# Patient Record
Sex: MALE | Race: WHITE
[De-identification: names, ages, dates, MRNs, and addresses within clinical notes are randomized per-mention and may not be internally consistent; named-entity substitution may affect disease eponyms.]

---

## 2017-03-18 ENCOUNTER — HOSPITAL ENCOUNTER (EMERGENCY)
Dept: HOSPITAL 61 - ER | Age: 22
Discharge: HOME | End: 2017-03-18
Payer: SELF-PAY

## 2017-03-18 VITALS — BODY MASS INDEX: 18.96 KG/M2 | HEIGHT: 72 IN | WEIGHT: 140 LBS

## 2017-03-18 VITALS — SYSTOLIC BLOOD PRESSURE: 119 MMHG | DIASTOLIC BLOOD PRESSURE: 69 MMHG

## 2017-03-18 DIAGNOSIS — R00.0: ICD-10-CM

## 2017-03-18 DIAGNOSIS — F41.9: ICD-10-CM

## 2017-03-18 DIAGNOSIS — F32.9: ICD-10-CM

## 2017-03-18 DIAGNOSIS — R55: Primary | ICD-10-CM

## 2017-03-18 PROCEDURE — 93005 ELECTROCARDIOGRAM TRACING: CPT

## 2017-03-18 NOTE — PHYS DOC
Past Medical History


Past Medical History:  Anxiety, Arrhythmia, Depression, Other


Additional Past Medical Histor:  SVT


Past Surgical History:  Other


Additional Past Surgical Histo:  cardiac ablation x2


Alcohol Use:  Rarely


Drug Use:  None





Adult General


Chief Complaint


Chief Complaint:  SEIZURE





HPI


HPI


21-year-old male presenting to the emergency department today with a syncopal 

episode today. The patient reports having a history of SVT having seen multiple 

cardiologists and currently following with the cardiologist. He is status post 

an ablation which was unfortunately unsuccessful. He has had multiple syncopal 

events from this and currently does not desire medical evaluation in the 

emergency department because "it always turns out to be normal". His boss made 

him come today. This happened while he was at work.





Review of systems is negative for chest pain shortness of breath abdominal pain 

nausea vomiting. Currently the patient denies any symptoms. All other review of 

systems is negative unless otherwise noted in history of present illness.





Review of Systems


Review of Systems


SEE ABOVE.





Allergies


Allergies





 Allergies








Coded Allergies Type Severity Reaction Last Updated Verified


 


  No Known Drug Allergies    5/6/14 No











Physical Exam


Physical Exam





Constitutional: Well developed, well nourished, no acute distress, non-toxic 

appearance. []


HENT: Normocephalic, atraumatic, bilateral external ears normal, oropharynx 

moist, no oral exudates, nose normal. []


Eyes: PERRLA, EOMI, conjunctiva normal, no discharge. [] 


Neck: Normal range of motion, no tenderness, supple, no stridor. [] 


Cardiovascular:Heart rate regular rhythm, no murmur []


Lungs & Thorax:  Bilateral breath sounds clear to auscultation []


Abdomen: Bowel sounds normal, soft, no tenderness, no masses, no pulsatile 

masses. [] 


Skin: Warm, dry, no erythema, no rash. [] 


Back: No tenderness, no CVA tenderness. [] 


Extremities: No tenderness, no cyanosis, no clubbing, ROM intact, no edema. [] 


Neurologic: Alert and oriented X 3, normal motor function, normal sensory 

function, no focal deficits noted. []


Psychologic: Affect normal, judgement normal, mood normal. []





Current Patient Data


Vital Signs





 Vital Signs








  Date Time  Temp Pulse Resp B/P Pulse Ox O2 Delivery O2 Flow Rate FiO2


 


3/18/17 02:22 98.5 118 16 119/69 100 Room Air  





 98.5       











EKG


EKG


[]





Radiology/Procedures


Radiology/Procedures


[]





Course & Med Decision Making


Course & Med Decision Making


Pertinent Labs and Imaging studies reviewed. (See chart for details)





[] 21-year-old male presenting to the emergency department today after having 

syncopal episode. Vital signs showed tachycardia otherwise unremarkable. 

Pertinent physical exam shows tachycardia otherwise unremarkable. EKG obtained 

which shows sinus tachycardia with a heart rate around 110 which the patient 

reports is normal. The patient did not desire to be treated or evaluated 

further. He demonstrated medical decision making capacity. He should insight 

into his situation. I can understand from his situation. I recommended he 

follow up with his cardiologist and primary care doctor over the next 2-3 days.





Dragon Disclaimer


Dragon Disclaimer


This electronic medical record was generated, in whole or in part, using a 

voice recognition dictation system.





Departure


Departure


Impression:  


 Primary Impression:  


 Syncope


Disposition:  01 HOME, SELF-CARE


Condition:  STABLE


Referrals:  


ENEDELIA TODD MD (PCP)


Patient Instructions:  Syncope





Additional Instructions:


Thank you for allowing us to participate in your care today.





Followup with your primary care physician in 3 days if your symptoms do not 

improve. If you do not have a primary care provider you can ask for a list of 

our primary care providers. Return to the emergency department you have any new 

or concerning findings.





This should be evaluated by the primary care physician and any necessary 

consulting services for continued management within a few days after discharge. 

Return to emergency room if you have any  new or concerning symptoms including 

but not limited to fever, chills, nausea, vomiting, intractable pain, any new 

rashes, chest pain, shortness of air, uncontrolled bleeding, difficulty 

breathing, and/or vision loss.








ZEINAB MARQUEZ MD Mar 18, 2017 02:43

## 2017-03-18 NOTE — EKG
[facility]

               [address] [place] [address]

Test Date:    2017               Test Time:    02:22:20

Pat Name:     MADDY ROLLINS             Department:   

Patient ID:   [id number]           Room:          

Gender:       M                        Technician:   

:          1995               Requested By: ZEINAB MARQUEZ

Order Number: [id number]            Reading MD:   Elizabeth Garcia

                                 Measurements

Intervals                              Axis          

Rate:         112                      P:            19

IL:           136                      QRS:          99

QRSD:         110                      T:            62

QT:           304                                    

QTc:          416                                    

                           Interpretive Statements

SINUS TACHYCARDIA

RIGHTWARD AXIS



INCOMPLETE RIGHT BUNDLE BRANCH BLOCK

RVH WITH REPOLARIZATION ABNORMALITY



RI6.01          Unconfirmed report

Compared to ECG 2015 08:37:26



Electronically Signed On 3- 15:08:23 CDT by Elizabeth Garcia

## 2017-06-02 ENCOUNTER — HOSPITAL ENCOUNTER (EMERGENCY)
Dept: HOSPITAL 61 - ER | Age: 22
LOS: 1 days | Discharge: HOME | End: 2017-06-03
Payer: SELF-PAY

## 2017-06-02 VITALS — HEIGHT: 72 IN | WEIGHT: 145 LBS | BODY MASS INDEX: 19.64 KG/M2

## 2017-06-02 VITALS — DIASTOLIC BLOOD PRESSURE: 66 MMHG | SYSTOLIC BLOOD PRESSURE: 113 MMHG

## 2017-06-02 DIAGNOSIS — Z79.899: ICD-10-CM

## 2017-06-02 DIAGNOSIS — F17.220: ICD-10-CM

## 2017-06-02 DIAGNOSIS — R55: Primary | ICD-10-CM

## 2017-06-02 DIAGNOSIS — F32.9: ICD-10-CM

## 2017-06-02 DIAGNOSIS — I47.1: ICD-10-CM

## 2017-06-02 DIAGNOSIS — F41.9: ICD-10-CM

## 2017-06-02 PROCEDURE — 93005 ELECTROCARDIOGRAM TRACING: CPT

## 2017-06-03 NOTE — EKG
[facility]

              [address] [place] [address]

Test Date:    2017               Test Time:    23:19:00

Pat Name:     MADDY ROLLINS             Department:   

Patient ID:   [id number]           Room:          

Gender:       M                        Technician:   

:          1995               Requested By: LUIS FELIPE PECK

Order Number: [id number]            Reading MD:   Fracisco Wynn

                                 Measurements

Intervals                              Axis          

Rate:         87                       P:            66

[place]:           156                      QRS:          105

QRSD:         106                      T:            33

QT:           328                                    

QTc:          400                                    

                           Interpretive Statements

SINUS RHYTHM

RIGHTWARD AXIS

S1,S2,S3 PATTERN

INCOMPLETE RIGHT BUNDLE BRANCH BLOCK

CONSIDER RIGHT VENTRICULAR HYPERTROPHY

RI6.01          Unconfirmed report

Compared to ECG 2017 02:22:20

Sinus tachycardia no longer present

Early repolarization no longer present



Electronically Signed On 2017 9:35:59 CDT by Fracisco Wynn

## 2018-08-27 ENCOUNTER — HOSPITAL ENCOUNTER (EMERGENCY)
Dept: HOSPITAL 61 - ER | Age: 23
LOS: 1 days | Discharge: HOME | End: 2018-08-28
Payer: SELF-PAY

## 2018-08-27 DIAGNOSIS — F41.9: ICD-10-CM
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
## 2018-08-27 DIAGNOSIS — F32.9: ICD-10-CM

## 2018-08-27 DIAGNOSIS — S06.0X0A: Primary | ICD-10-CM

## 2018-08-27 PROCEDURE — 99283 EMERGENCY DEPT VISIT LOW MDM: CPT

## 2018-08-28 VITALS — DIASTOLIC BLOOD PRESSURE: 72 MMHG | SYSTOLIC BLOOD PRESSURE: 109 MMHG

## 2018-08-28 NOTE — PHYS DOC
Past Medical History


Past Medical History:  Anxiety, Arrhythmia, Depression, Other


Additional Past Medical Histor:  SVT


Past Surgical History:  Other


Additional Past Surgical Histo:  cardiac ablation x2


Alcohol Use:  Rarely


Drug Use:  None





Adult General


HPI


HPI





Patient is a 23  year old male who presents with generalized headaches. The 

patient has a known history of SVT. He is normally followed at [facility] for this problem. He was at work 2 nights ago when he had an episode of 

SVT. He states he passed out and fell. This is a normal occurrence for the 

patient. He routinely has syncopal episodes when he is having SVT. His primary 

complaint this evening is that he struck his head on the floor. He is concerned 

that he sustained a head injury because he has had some headaches since the 

incident.  No SVT today. No chest pain, or shortness of breath. No nausea or 

vomiting. No vision changes. Headaches are diffuse and bilateral in the 

temporal areas.





Review of Systems


Review of Systems





Constitutional: Denies fever or chills 


Eyes: Denies change in visual acuity, redness, or eye pain 


HENT: Denies nasal congestion or sore throat 


Respiratory: Denies cough or shortness of breath 


Cardiovascular: No additional information not addressed in HPI 


GI: Denies abdominal pain, nausea, vomiting, bloody stools or diarrhea 


: Denies dysuria or hematuria 


Musculoskeletal: Denies back pain or joint pain 


Integument: Denies rash or skin lesions 


Neurologic: Denies headache, focal weakness or sensory changes 


Endocrine: Denies polyuria or polydipsia 





All other systems were reviewed and found to be within normal limits, except as 

documented in this note.





Allergies


Allergies





Allergies








Coded Allergies Type Severity Reaction Last Updated Verified


 


  No Known Drug Allergies    5/6/14 No











Physical Exam


Physical Exam





Constitutional: Well developed, well nourished, no acute distress, non-toxic 

appearance


HENT: Normocephalic, atraumatic, bilateral external ears normal, oropharynx 

moist


Eyes: PERRLA, EOMI, conjunctiva normal, no discharge 


Neck: Normal range of motion, no tenderness, supple, no stridor 


Cardiovascular:Heart rate regular rhythm, no murmur


Lungs & Thorax:  Bilateral breath sounds clear to auscultation 


Abdomen: Bowel sounds normal, soft, no tenderness 


Skin: Warm, dry, no erythema, no rash 


Back: No tenderness, no CVA tenderness 


Extremities: No tenderness, no cyanosis, no clubbing, ROM intact, no edema 


Neurologic: Alert and oriented X 3, normal motor function


Psychologic: Affect normal





EKG


EKG


[]





Radiology/Procedures


Radiology/Procedures


[]





Course & Med Decision Making


Course & Med Decision Making


Pertinent Labs and Imaging studies reviewed. (See chart for details)





02:00:  Patient is seen and examined in the ER. He does not have any signs of 

trauma to his face or scalp. He complains of diffuse headaches but he has a 

completely normal neurologic exam. He has no red flags on his history of 

present illness. The patient does not meet criteria for imaging based on [place] 

[place] criteria or [facility] head CT rules or nexus. Plan this evening is to 

give the patient some Fioricet for control of his headaches. He is advised to 

use ibuprofen or plain Tylenol first. Regarding his SVT, he has no complaints 

about this this evening. He is advised to follow-up at [facility] for that. 

A work release is also provided for the patient. The patient is agreeable to 

the plan of care and all of his questions are answered prior to discharge home.





Dragon Disclaimer


Dragon Disclaimer


This electronic medical record was generated, in whole or in part, using a 

voice recognition dictation system.





Departure


Departure


Impression:  


 Primary Impression:  


 Concussion


Disposition:  01 HOME, SELF-CARE


Condition:  GOOD


Referrals:  


ENEDELIA TODD MD (PCP)


Patient Instructions:  Concussion and Brain Injury, Easy-to-Read


Scripts


Butalb/Acetaminophen/Caffeine ([id number] -40) 1 Each Capsule


1 EACH PO TID PRN for HEADACHE, #15 CAP


   Prov: CARIN BURDEN DO         8/28/18











ACRIN BURDEN DO Aug 28, 2018 02:12

## 2020-07-20 ENCOUNTER — HOSPITAL ENCOUNTER (EMERGENCY)
Dept: HOSPITAL 61 - ER | Age: 25
Discharge: HOME | End: 2020-07-20
Payer: COMMERCIAL

## 2020-07-20 VITALS — DIASTOLIC BLOOD PRESSURE: 74 MMHG | SYSTOLIC BLOOD PRESSURE: 112 MMHG

## 2020-07-20 VITALS — WEIGHT: 160.28 LBS | HEIGHT: 72 IN | BODY MASS INDEX: 21.71 KG/M2
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
## 2020-07-20 DIAGNOSIS — R55: ICD-10-CM

## 2020-07-20 DIAGNOSIS — F32.9: ICD-10-CM
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
## 2020-07-20 DIAGNOSIS — F41.9: ICD-10-CM
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
## 2020-07-20 DIAGNOSIS — S06.9X1A: Primary | ICD-10-CM

## 2020-07-20 PROCEDURE — 70450 CT HEAD/BRAIN W/O DYE: CPT

## 2020-07-20 PROCEDURE — 93005 ELECTROCARDIOGRAM TRACING: CPT

## 2020-07-20 NOTE — PHYS DOC
Past Medical History


Past Medical History:  Anxiety, Arrhythmia, Depression, Other


Additional Past Medical Histor:  SVT


Past Surgical History:  Other


Additional Past Surgical Histo:  cardiac ablation x2


Smoking Status:  Never Smoker


Alcohol Use:  None


Drug Use:  None





General Adult


EDM:


Chief Complaint:  SYNCOPE





HPI:


HPI:





Patient is a 25  year old male who presents to the emergency department with re

ports of a syncopal episode while he was at work today.  Patient states that he 

took a drink of water and felt a painful gas sensation from his throat to his 

abdomen.  Patient reports after this sensation he passed out for less than 10 

seconds.  He reports that coworkers witnessed the episode.  He currently 

complains of a headache behind his left eye that feels like a migraine.  He 

denies any vision changes.  Patient denies any nausea, vomiting, diarrhea, 

abdominal pain, fever, cough, shortness of breath, numbness, tingling, or 

weakness.  He denies any chest pain or palpitations at this time.  Patient 

reports that he feels dizzy with position changes.  He currently rates his 

headache a 7 out of 10 on the pain scale, he denies any needing a 9 or 

exacerbating factors.  He denies photosensitivity, but reports blurred vision 

and double vision with lateral gaze to the left.





Review of Systems:


Review of Systems:


Constitutional:   Denies fever or chills. []


Eyes:   See HPI


HENT:   Denies nasal congestion or sore throat. [] 


Respiratory:   Denies cough or shortness of breath. [] 


Cardiovascular:   Currently denies chest pain or edema; see HPI. [] 


GI:   Denies abdominal pain, nausea, vomiting, or diarrhea. [] 


Musculoskeletal:   Denies back pain or joint pain. [] 


Integument:   Denies rash. [] 


Neurologic:   Denies focal weakness; see HPI


Psychiatric:  Denies depression or anxiety. []





Heart Score:


Risk Factors:


Risk Factors:  DM, Current or recent (<one month) smoker, HTN, HLP, family 

history of CAD, obesity.


Risk Scores:


Score 0 - 3:  2.5% MACE over next 6 weeks - Discharge Home


Score 4 - 6:  20.3% MACE over next 6 weeks - Admit for Clinical Observation


Score 7 - 10:  72.7% MACE over next 6 weeks - Early Invasive Strategies





Allergies:


Allergies:





Allergies








Coded Allergies Type Severity Reaction Last Updated Verified


 


  No Known Drug Allergies    14 No











Physical Exam:


PE:





Constitutional: Well developed, well nourished, no acute distress, non-toxic 

appearance. []


HENT: Normocephalic, atraumatic, bilateral external ears normal, oropharynx 

moist, nose normal


Eyes: PERRLA, EOMI, conjunctiva normal, no discharge; ; lateral nystagmus with 

left gaze. []. [] 


Neck: Normal range of motion,  no stridor. [] 


Cardiovascular:Heart rate regular rhythm, no murmur []


Lungs & Thorax:  Bilateral breath sounds clear to auscultation, Respirations 

even and unlabored, no retractions, no respiratory distress []


Abdomen: soft, no tenderness, no masses, no pulsatile masses. [] 


Skin: Warm, dry, no erythema, no rash. [] 


Back: No tenderness


Extremities: No cyanosis, ROM intact, no edema. [] 


Neurologic: Alert and oriented X 3, normal motor function, normal sensory 

function, no focal deficits noted. []


Psychologic: Affect normal, judgement normal, mood normal. []





Current Patient Data:


Vital Signs:





                                   Vital Signs








  Date Time  Temp Pulse Resp B/P (MAP) Pulse Ox O2 Delivery O2 Flow Rate FiO2


 


20 11:36 98.2 82 16 112/70 (84) 100 Room Air  





 98.2       











EKG:


EK-sinus rhythm with rightward axis, rate 81, incomplete right bundle branch 

block; no STEMI, read by Dr. Hernandez []





Radiology/Procedures:


Radiology/Procedures:


PROCEDURE: CT HEAD WO CONTRAST





 


CT HEAD


 


INDICATION: Reason: syncopal episode this morning at work / Spl. 


Instructions:  / History: 


 


COMPARISON: 2016


 


Exposure: One or more of the following individualized dose reduction 


techniques were utilized for this examination:  1. Automated exposure 


control  2. Adjustment of the mA and/or kV according to patient size  3. 


Use of iterative reconstruction technique


 


TECHNIQUE: 5 mm contiguous axial images were obtained from the skull base 


to the vertex in both bone and soft tissue algorithm.  


 


FINDINGS: 


 


 


No abnormal attenuation within the brain parenchyma.  


 


No evidence of acute intracranial hemorrhage.   No extra-axial fluid 


collections.


 


No mass effect or midline shift. Ventricular size is appropriate.  Basal 


cisterns are patent.


 


No fractures identified.Gray-white differentiation is preserved.Globes and


orbits are within normal limits.   Paranasal sinuses and mastoid air cells


are clear.   


 


IMPRESSION:


 


No acute intracranial findings. []





Course & Med Decision Making:


Course & Med Decision Making


Pertinent Labs and Imaging studies reviewed. (See chart for details)


1215-patient refuses IV, lab work, fluids, or medications.  He states he just 

wants a CT of his head to make sure that everything is okay.





CT head is negative for any acute findings.


[]





Dragon Disclaimer:


Dragon Disclaimer:


This electronic medical record was generated, in whole or in part, using a voice

 recognition dictation system.





Departure


Departure


Impression:  


   Primary Impression:  


   Syncope


   Qualified Codes:  R55 - Syncope and collapse


   Additional Impression:  


   Closed head injury with brief loss of consciousness


Disposition:   HOME, SELF-CARE


Condition:  STABLE


Referrals:  


ENEDELIA TODD MD (PCP)


Patient Instructions:  Head Injury, Adult, Easy-to-Read, Syncope, Easy-to-Read





Additional Instructions:  


Follow the head injury precautions provided. Follow up with your primary care 

doctor in 1-2 days You refused an IV, labs, or medications for further 

evaluation of your syncope and treatment of your pain. Return to the ER if sym

ptoms worsen.





Justicifation of Admission Dx:


Justifications for Admission:


Justification of Admission Dx:  N/A











EL REINA       2020 12:21

## 2020-07-20 NOTE — RAD
CT HEAD

 

INDICATION: Reason: syncopal episode this morning at work / Spl. 

Instructions:  / History: 

 

COMPARISON: 5/13/2016

 

Exposure: One or more of the following individualized dose reduction 

techniques were utilized for this examination:  1. Automated exposure 

control  2. Adjustment of the mA and/or kV according to patient size  3. 

Use of iterative reconstruction technique

 

TECHNIQUE: 5 mm contiguous axial images were obtained from the skull base 

to the vertex in both bone and soft tissue algorithm.  

 

FINDINGS: 

 

 

No abnormal attenuation within the brain parenchyma.  

 

No evidence of acute intracranial hemorrhage.   No extra-axial fluid 

collections.

 

No mass effect or midline shift. Ventricular size is appropriate.  Basal 

cisterns are patent.

 

No fractures identified.Gray-white differentiation is preserved.Globes and

orbits are within normal limits.   Paranasal sinuses and mastoid air cells

are clear.   

 

IMPRESSION:

 

No acute intracranial findings. 

 

Electronically signed by: Francis Dalal MD (7/20/2020 12:34 PM) [id number]

## 2020-07-21 NOTE — EKG
[facility]

              [address] [place] [address]

Test Date:    2020               Test Time:    11:32:32

Pat Name:     MADDY ROLLINS             Department:   

Patient ID:   [id number]           Room:          

Gender:       M                        Technician:   

:          1995               Requested By: EL REINA

Order Number: [id number]           Reading MD:     

                                 Measurements

Intervals                              Axis          

Rate:         81                       P:            67

NM:           180                      QRS:          93

QRSD:         110                      T:            51

QT:           334                                    

QTc:          388                                    

                           Interpretive Statements

SINUS RHYTHM

RIGHTWARD AXIS

S1,S2,S3 PATTERN

INCOMPLETE RIGHT BUNDLE BRANCH BLOCK

RVH WITH REPOLARIZATION ABNORMALITY

ABNORMAL ECG

RI6.01

No previous ECG available for comparison